# Patient Record
Sex: FEMALE | Race: WHITE | NOT HISPANIC OR LATINO | Employment: FULL TIME | URBAN - METROPOLITAN AREA
[De-identification: names, ages, dates, MRNs, and addresses within clinical notes are randomized per-mention and may not be internally consistent; named-entity substitution may affect disease eponyms.]

---

## 2023-12-05 ENCOUNTER — APPOINTMENT (OUTPATIENT)
Dept: PRIMARY CARE | Facility: CLINIC | Age: 48
End: 2023-12-05
Payer: COMMERCIAL

## 2024-01-24 ENCOUNTER — TELEPHONE (OUTPATIENT)
Dept: PRIMARY CARE | Facility: CLINIC | Age: 49
End: 2024-01-24
Payer: COMMERCIAL

## 2024-01-24 DIAGNOSIS — F41.1 ANXIETY, GENERALIZED: Primary | ICD-10-CM

## 2024-01-24 RX ORDER — BUSPIRONE HYDROCHLORIDE 7.5 MG/1
7.5 TABLET ORAL DAILY PRN
COMMUNITY
Start: 2023-05-24 | End: 2024-01-24 | Stop reason: SDUPTHER

## 2024-01-24 RX ORDER — BUSPIRONE HYDROCHLORIDE 7.5 MG/1
7.5 TABLET ORAL DAILY PRN
Qty: 30 TABLET | Refills: 0 | Status: SHIPPED | OUTPATIENT
Start: 2024-01-24 | End: 2024-05-08 | Stop reason: SDUPTHER

## 2024-01-24 NOTE — TELEPHONE ENCOUNTER
Refills for Buspirone and also she is interested  in a migraine medication.    Pharm- [facility] on file

## 2024-01-25 ENCOUNTER — APPOINTMENT (OUTPATIENT)
Dept: PRIMARY CARE | Facility: CLINIC | Age: 49
End: 2024-01-25
Payer: COMMERCIAL

## 2024-02-07 ENCOUNTER — OFFICE VISIT (OUTPATIENT)
Dept: PRIMARY CARE | Facility: CLINIC | Age: 49
End: 2024-02-07
Payer: COMMERCIAL

## 2024-02-07 ENCOUNTER — LAB (OUTPATIENT)
Dept: LAB | Facility: LAB | Age: 49
End: 2024-02-07
Payer: COMMERCIAL

## 2024-02-07 VITALS
HEART RATE: 72 BPM | TEMPERATURE: 97.6 F | WEIGHT: 222.8 LBS | OXYGEN SATURATION: 98 % | DIASTOLIC BLOOD PRESSURE: 82 MMHG | SYSTOLIC BLOOD PRESSURE: 121 MMHG | HEIGHT: 66 IN | BODY MASS INDEX: 35.81 KG/M2

## 2024-02-07 DIAGNOSIS — Z12.12 ENCOUNTER FOR COLORECTAL CANCER SCREENING: ICD-10-CM

## 2024-02-07 DIAGNOSIS — Z00.00 ANNUAL PHYSICAL EXAM: Primary | ICD-10-CM

## 2024-02-07 DIAGNOSIS — Z12.31 ENCOUNTER FOR SCREENING MAMMOGRAM FOR BREAST CANCER: ICD-10-CM

## 2024-02-07 DIAGNOSIS — E55.9 VITAMIN D DEFICIENCY: Primary | ICD-10-CM

## 2024-02-07 DIAGNOSIS — Z12.11 ENCOUNTER FOR COLORECTAL CANCER SCREENING: ICD-10-CM

## 2024-02-07 DIAGNOSIS — G43.009 MIGRAINE WITHOUT AURA AND WITHOUT STATUS MIGRAINOSUS, NOT INTRACTABLE: ICD-10-CM

## 2024-02-07 DIAGNOSIS — Z00.00 ANNUAL PHYSICAL EXAM: ICD-10-CM

## 2024-02-07 LAB
25(OH)D3 SERPL-MCNC: 19 NG/ML (ref 30–100)
ALBUMIN SERPL BCP-MCNC: 4.1 G/DL (ref 3.4–5)
ALP SERPL-CCNC: 41 U/L (ref 33–110)
ALT SERPL W P-5'-P-CCNC: 14 U/L (ref 7–45)
ANION GAP SERPL CALC-SCNC: 10 MMOL/L (ref 10–20)
AST SERPL W P-5'-P-CCNC: 22 U/L (ref 9–39)
BASOPHILS # BLD AUTO: 0.03 X10*3/UL (ref 0–0.1)
BASOPHILS NFR BLD AUTO: 0.3 %
BILIRUB SERPL-MCNC: 0.7 MG/DL (ref 0–1.2)
BUN SERPL-MCNC: 8 MG/DL (ref 6–23)
CALCIUM SERPL-MCNC: 9.6 MG/DL (ref 8.6–10.6)
CHLORIDE SERPL-SCNC: 103 MMOL/L (ref 98–107)
CHOLEST SERPL-MCNC: 170 MG/DL (ref 0–199)
CHOLESTEROL/HDL RATIO: 2.6
CO2 SERPL-SCNC: 31 MMOL/L (ref 21–32)
CREAT SERPL-MCNC: 0.8 MG/DL (ref 0.5–1.05)
EGFRCR SERPLBLD CKD-EPI 2021: 90 ML/MIN/1.73M*2
EOSINOPHIL # BLD AUTO: 0.04 X10*3/UL (ref 0–0.7)
EOSINOPHIL NFR BLD AUTO: 0.5 %
ERYTHROCYTE [DISTWIDTH] IN BLOOD BY AUTOMATED COUNT: 13.3 % (ref 11.5–14.5)
EST. AVERAGE GLUCOSE BLD GHB EST-MCNC: 108 MG/DL
GLUCOSE SERPL-MCNC: 90 MG/DL (ref 74–99)
HBA1C MFR BLD: 5.4 %
HCT VFR BLD AUTO: 40.5 % (ref 36–46)
HDLC SERPL-MCNC: 65.8 MG/DL
HGB BLD-MCNC: 13 G/DL (ref 12–16)
IMM GRANULOCYTES # BLD AUTO: 0.02 X10*3/UL (ref 0–0.7)
IMM GRANULOCYTES NFR BLD AUTO: 0.2 % (ref 0–0.9)
LDLC SERPL CALC-MCNC: 96 MG/DL
LYMPHOCYTES # BLD AUTO: 0.68 X10*3/UL (ref 1.2–4.8)
LYMPHOCYTES NFR BLD AUTO: 7.9 %
MCH RBC QN AUTO: 30 PG (ref 26–34)
MCHC RBC AUTO-ENTMCNC: 32.1 G/DL (ref 32–36)
MCV RBC AUTO: 93 FL (ref 80–100)
MONOCYTES # BLD AUTO: 0.73 X10*3/UL (ref 0.1–1)
MONOCYTES NFR BLD AUTO: 8.4 %
NEUTROPHILS # BLD AUTO: 7.16 X10*3/UL (ref 1.2–7.7)
NEUTROPHILS NFR BLD AUTO: 82.7 %
NON HDL CHOLESTEROL: 104 MG/DL (ref 0–149)
NRBC BLD-RTO: 0 /100 WBCS (ref 0–0)
PLATELET # BLD AUTO: 242 X10*3/UL (ref 150–450)
POTASSIUM SERPL-SCNC: 4.2 MMOL/L (ref 3.5–5.3)
PROT SERPL-MCNC: 7.1 G/DL (ref 6.4–8.2)
RBC # BLD AUTO: 4.34 X10*6/UL (ref 4–5.2)
SODIUM SERPL-SCNC: 140 MMOL/L (ref 136–145)
TRIGL SERPL-MCNC: 42 MG/DL (ref 0–149)
TSH SERPL-ACNC: 2.91 MIU/L (ref 0.44–3.98)
VIT B12 SERPL-MCNC: 1025 PG/ML (ref 211–911)
VLDL: 8 MG/DL (ref 0–40)
WBC # BLD AUTO: 8.7 X10*3/UL (ref 4.4–11.3)

## 2024-02-07 PROCEDURE — 82306 VITAMIN D 25 HYDROXY: CPT

## 2024-02-07 PROCEDURE — 84443 ASSAY THYROID STIM HORMONE: CPT

## 2024-02-07 PROCEDURE — 90471 IMMUNIZATION ADMIN: CPT | Performed by: INTERNAL MEDICINE

## 2024-02-07 PROCEDURE — 82607 VITAMIN B-12: CPT

## 2024-02-07 PROCEDURE — 80053 COMPREHEN METABOLIC PANEL: CPT

## 2024-02-07 PROCEDURE — 83036 HEMOGLOBIN GLYCOSYLATED A1C: CPT

## 2024-02-07 PROCEDURE — 1036F TOBACCO NON-USER: CPT | Performed by: INTERNAL MEDICINE

## 2024-02-07 PROCEDURE — 80061 LIPID PANEL: CPT

## 2024-02-07 PROCEDURE — 36415 COLL VENOUS BLD VENIPUNCTURE: CPT

## 2024-02-07 PROCEDURE — 99396 PREV VISIT EST AGE 40-64: CPT | Performed by: INTERNAL MEDICINE

## 2024-02-07 PROCEDURE — 90715 TDAP VACCINE 7 YRS/> IM: CPT | Performed by: INTERNAL MEDICINE

## 2024-02-07 PROCEDURE — 85025 COMPLETE CBC W/AUTO DIFF WBC: CPT

## 2024-02-07 RX ORDER — ELETRIPTAN HYDROBROMIDE 20 MG/1
20 TABLET, FILM COATED ORAL ONCE AS NEEDED
Qty: 18 TABLET | Refills: 3 | Status: SHIPPED | OUTPATIENT
Start: 2024-02-07 | End: 2024-02-09

## 2024-02-07 RX ORDER — ERGOCALCIFEROL 1.25 MG/1
50000 CAPSULE ORAL
Qty: 4 CAPSULE | Refills: 1 | Status: SHIPPED | OUTPATIENT
Start: 2024-02-07 | End: 2024-04-03

## 2024-02-07 ASSESSMENT — PATIENT HEALTH QUESTIONNAIRE - PHQ9
1. LITTLE INTEREST OR PLEASURE IN DOING THINGS: NOT AT ALL
2. FEELING DOWN, DEPRESSED OR HOPELESS: NOT AT ALL
SUM OF ALL RESPONSES TO PHQ9 QUESTIONS 1 AND 2: 0

## 2024-02-07 NOTE — RESULT ENCOUNTER NOTE
Reviewed your labs.  B12 is higher than normal.  Take supplements orally 3 times a week  Vitamin D is still low.  I called in high-dose prescription to be taken for 3 months  Rest of the labs including thyroid function and liver and kidney function and cholesterol normal  Blood sugar is also normal

## 2024-02-07 NOTE — PROGRESS NOTES
Subjective   Patient ID: Mary Brown is a 48 y.o. female who presents for Annual Exam.  HPI    Patient is here for annual exam  Has been getting headache on left side with nausea and photophobia.at least 4 times a month.some times back of head.no aura.not intractable.  Last exam was one  year ago ,sees ophthalmologist regularly.    Consumes healthy diet.    does regular exercise  pregnancy history g0  No bladder symptoms  Cervical cancer screen current .  Mammogram due   Colonoscopy not done.      Review of Systems  General: lost wt by changing diet.has fatigue, no fever, chills, or night sweats.  HEENT: No dizziness, syncope. No blurred vision, double vision. No hearing loss, or ringing. No nasal discharge, sinus problems. No loose teeth, sore throat, hoarseness or neck stiffness.   Breast: No pain or discharge. No mass felt.   Respiratory: No cough, mucous in throat, hemoptysis, wheezing, or shortness of breath. No snoring.  Cardiac: No chest pain, palpitations, orthopnea. No leg swelling or claudication pain.   Gastrointestinal: No indigestion, heartburn, nausea, vomiting, diarrhea, constipation, rectal bleeding or hemorrhoids.  Genitourinary: No UTI symptoms, stones, incontinence.  OBGYN: No abnormal bleeding, No pelvic pain or bloating.  Endocrine: No excess thirst or urination.  Hematopoietic: No anemia, easy bruising or bleeding. No history of blood transfusion.  Neuro: No localized weakness, numbness or tingling. No tremor, history of seizure. No history of memory problems.  Psychological: No anxiety, depression or sleep disturbance.    HISTORY  Social History     Socioeconomic History    Marital status: Single     Spouse name: Not on file    Number of children: Not on file    Years of education: Not on file    Highest education level: Not on file   Occupational History    Not on file   Tobacco Use    Smoking status: Never    Smokeless tobacco: Never   Substance and Sexual Activity    Alcohol use: Yes  "   Drug use: Not on file    Sexual activity: Not on file   Other Topics Concern    Not on file   Social History Narrative    Not on file     Social Determinants of Health     Financial Resource Strain: Not on file   Food Insecurity: Not on file   Transportation Needs: Not on file   Physical Activity: Not on file   Stress: Not on file   Social Connections: Not on file   Intimate Partner Violence: Not on file   Housing Stability: Not on file     Family History   Problem Relation Name Age of Onset    Coronary artery disease Father      Hypoparathyroidism Father      Diabetes type II Father       History reviewed. No pertinent past medical history.  History reviewed. No pertinent surgical history.    Objective   /82   Pulse 72   Temp 36.4 °C (97.6 °F)   Ht 1.676 m (5' 6\")   Wt 101 kg (222 lb 12.8 oz)   SpO2 98%   BMI 35.96 kg/m²      Lab Results   Component Value Date    WBC 5.4 02/17/2021    HGB 12.7 02/17/2021    HCT 40.5 02/17/2021    MCV 94 02/17/2021     02/17/2021      Physical Exam  In general, well-appearing, not in acute distress, alert and oriented.  HEENT: Pupils PERRLA.  No pallor or icterus  Neck: No lymphadenopathy, no stiffness.  Supple.  No enlargement of thyroid.No JVD  Chest: Clear to auscultation, good air entry.  CVS: S1 and S2 regular.  No murmur, no gallop, S3 or S4.  No peripheral edema.  No carotid bruit.  Abdomen: Soft, no tenderness, no hepatosplenomegaly.  Extremities: No calf tenderness.  No peripheral edema.  No knee or ankle effusion.  No finger synovitis.  No clubbing or cyanosis.  Neuro: No focal deficits.  Psych: Mood and affect normal.  Good judgment and insight  Skin:No rash    Assessment/Plan   Problem List Items Addressed This Visit       Annual physical exam - Primary    Relevant Orders    CBC and Auto Differential    Comprehensive Metabolic Panel    Hemoglobin A1C    Lipid Panel    TSH with reflex to Free T4 if abnormal    Vitamin B12    Vitamin D " 25-Hydroxy,Total (for eval of Vitamin D levels)     Other Visit Diagnoses       Encounter for colorectal cancer screening        Relevant Orders    Cologuard® colon cancer screening    Migraine without aura and without status migrainosus, not intractable        Relevant Medications    eletriptan (Relpax) 20 mg tablet    Encounter for screening mammogram for breast cancer        Relevant Orders    BI mammo bilateral screening tomosynthesis          Preventive exam and counseling completed  Routine labs ordered.  Due to migraine additional labs  Try Relpax  Do stretches and trigger point release  Continue massage therapy for neck pain  Cologuard for screening.  Declines colonoscopy  Up-to-date with GYN care  Mammogram ordered.  TLC to be continued for weight loss

## 2024-02-08 ENCOUNTER — TELEPHONE (OUTPATIENT)
Dept: PRIMARY CARE | Facility: CLINIC | Age: 49
End: 2024-02-08

## 2024-02-08 NOTE — TELEPHONE ENCOUNTER
Patient states that the Eletriptan is too expensive and would like to know if you can call in a new med ?

## 2024-02-09 DIAGNOSIS — G43.009 MIGRAINE WITHOUT AURA AND WITHOUT STATUS MIGRAINOSUS, NOT INTRACTABLE: Primary | ICD-10-CM

## 2024-02-09 RX ORDER — RIZATRIPTAN BENZOATE 10 MG/1
10 TABLET, ORALLY DISINTEGRATING ORAL ONCE AS NEEDED
Qty: 9 TABLET | Refills: 0 | Status: SHIPPED | OUTPATIENT
Start: 2024-02-09 | End: 2024-03-20

## 2024-02-29 ENCOUNTER — APPOINTMENT (OUTPATIENT)
Dept: PRIMARY CARE | Facility: CLINIC | Age: 49
End: 2024-02-29
Payer: COMMERCIAL

## 2024-03-20 ENCOUNTER — TELEPHONE (OUTPATIENT)
Dept: PRIMARY CARE | Facility: CLINIC | Age: 49
End: 2024-03-20
Payer: COMMERCIAL

## 2024-03-20 DIAGNOSIS — G43.009 MIGRAINE WITHOUT AURA AND WITHOUT STATUS MIGRAINOSUS, NOT INTRACTABLE: ICD-10-CM

## 2024-03-20 RX ORDER — SUMATRIPTAN 50 MG/1
50 TABLET, FILM COATED ORAL ONCE AS NEEDED
Qty: 30 TABLET | Refills: 3 | Status: SHIPPED | OUTPATIENT
Start: 2024-03-20 | End: 2025-03-20

## 2024-03-20 NOTE — TELEPHONE ENCOUNTER
----- Message from Amira Miller MD sent at 3/19/2024  4:58 PM EDT -----  Please call patient and give her the test results and  ask her to review test results in chart.

## 2024-03-20 NOTE — TELEPHONE ENCOUNTER
Spoke with pt she is aware of results.    Discussed with patient.  Advised to cut down on B12 to 3 times a week.  She states that she is only taking it once a week and will continue that  After 2 multivitamin daily can take over-the-counter 2000 units daily

## 2024-05-08 ENCOUNTER — LAB (OUTPATIENT)
Dept: LAB | Facility: LAB | Age: 49
End: 2024-05-08
Payer: COMMERCIAL

## 2024-05-08 ENCOUNTER — OFFICE VISIT (OUTPATIENT)
Dept: PRIMARY CARE | Facility: CLINIC | Age: 49
End: 2024-05-08
Payer: COMMERCIAL

## 2024-05-08 VITALS
DIASTOLIC BLOOD PRESSURE: 93 MMHG | SYSTOLIC BLOOD PRESSURE: 137 MMHG | HEIGHT: 66 IN | BODY MASS INDEX: 35.96 KG/M2 | OXYGEN SATURATION: 97 % | HEART RATE: 59 BPM

## 2024-05-08 DIAGNOSIS — L30.9 DERMATITIS: ICD-10-CM

## 2024-05-08 DIAGNOSIS — F41.1 ANXIETY, GENERALIZED: Primary | ICD-10-CM

## 2024-05-08 DIAGNOSIS — N95.1 MENOPAUSAL SYMPTOM: ICD-10-CM

## 2024-05-08 DIAGNOSIS — G43.009 MIGRAINE WITHOUT AURA AND WITHOUT STATUS MIGRAINOSUS, NOT INTRACTABLE: ICD-10-CM

## 2024-05-08 LAB
ESTRADIOL SERPL-MCNC: 41 PG/ML
FSH SERPL-ACNC: 74.7 IU/L
LH SERPL-ACNC: 48.3 IU/L
PROGEST SERPL-MCNC: <0.3 NG/ML
PROLACTIN SERPL-MCNC: 18 UG/L (ref 3–20)

## 2024-05-08 PROCEDURE — 82677 ASSAY OF ESTRIOL: CPT

## 2024-05-08 PROCEDURE — 83001 ASSAY OF GONADOTROPIN (FSH): CPT

## 2024-05-08 PROCEDURE — 84146 ASSAY OF PROLACTIN: CPT

## 2024-05-08 PROCEDURE — 84144 ASSAY OF PROGESTERONE: CPT

## 2024-05-08 PROCEDURE — 82670 ASSAY OF TOTAL ESTRADIOL: CPT

## 2024-05-08 PROCEDURE — 36415 COLL VENOUS BLD VENIPUNCTURE: CPT

## 2024-05-08 PROCEDURE — 1036F TOBACCO NON-USER: CPT | Performed by: INTERNAL MEDICINE

## 2024-05-08 PROCEDURE — 99214 OFFICE O/P EST MOD 30 MIN: CPT | Performed by: INTERNAL MEDICINE

## 2024-05-08 PROCEDURE — 82672 ASSAY OF ESTROGEN: CPT

## 2024-05-08 PROCEDURE — 83002 ASSAY OF GONADOTROPIN (LH): CPT

## 2024-05-08 RX ORDER — UBROGEPANT 50 MG/1
1 TABLET ORAL DAILY PRN
Qty: 10 TABLET | Refills: 0 | Status: SHIPPED | OUTPATIENT
Start: 2024-05-08

## 2024-05-08 RX ORDER — CLOTRIMAZOLE AND BETAMETHASONE DIPROPIONATE 10; .64 MG/G; MG/G
1 CREAM TOPICAL 2 TIMES DAILY
Qty: 30 G | Refills: 0 | Status: SHIPPED | OUTPATIENT
Start: 2024-05-08 | End: 2024-06-05

## 2024-05-08 RX ORDER — METHYLPREDNISOLONE 4 MG/1
TABLET ORAL
Qty: 21 TABLET | Refills: 0 | Status: SHIPPED | OUTPATIENT
Start: 2024-05-08 | End: 2024-05-15

## 2024-05-08 RX ORDER — BUSPIRONE HYDROCHLORIDE 7.5 MG/1
15 TABLET ORAL DAILY
Qty: 30 TABLET | Refills: 1 | Status: SHIPPED | OUTPATIENT
Start: 2024-05-08 | End: 2024-06-07

## 2024-05-08 ASSESSMENT — PATIENT HEALTH QUESTIONNAIRE - PHQ9
SUM OF ALL RESPONSES TO PHQ9 QUESTIONS 1 AND 2: 0
1. LITTLE INTEREST OR PLEASURE IN DOING THINGS: NOT AT ALL
2. FEELING DOWN, DEPRESSED OR HOPELESS: NOT AT ALL

## 2024-05-08 NOTE — PROGRESS NOTES
"Subjective   Patient ID: Mary Brown is a 48 y.o. female who presents for Follow-up (Pt has severe headache 2 days now. ).    HPI   Has right sided headache since yesterday  Right side of face feels numb  Pain is 7/10.migraine is more frequent.getting it 4 times a month.  This is not the worst headache of her life  No nausea or vomiting  Slight dizziness  Imitrex did not work for migraine  Had a party and had more sugar Monday  Thinks it triggered migraine.no aura.lasts more than 3 ays  sometimes  Was drinking energy drinks  No family history of aneurysm  Continues to have brain fog  Has endometriosis  Lmp November  No hot flashes  Has mood issues,gets irritable.on buspar as needed  Not depressed  Has work issues      Review of Systems  Constitutional: No recent weight loss, no fever or chills or night sweats. tired  HEENT: No blurred vision or double vision.  No hearing loss.  no sinus drainage or nosebleeds  CVS: No exertional chest pain or shortness of breath.  No palpitation or edema  Respiratory: No chronic cough or shortness of breath or wheezing  GI: No nausea vomiting or heartburn diarrhea or constipation.  No rectal bleed or bowel changes  Genitourinary: No urinary frequency or hesitancy.  No nocturia or blood in urine  Neuro: No weakness  Psych:  Objective   BP (!) 137/93   Pulse 59   Ht 1.676 m (5' 6\")   SpO2 97%   BMI 35.96 kg/m²     Physical Exam  In general, well-appearing, not in acute distress, alert and oriented.  HEENT: Pupils PERRLA.  No pallor or icterus  Neck: No lymphadenopathy, no stiffness.  Supple.  No enlargement of thyroid.No JVD  Chest: Clear to auscultation, good air entry.  CVS: S1 and S2 regular.  No murmur, no gallop, S3 or S4.  No peripheral edema.  No carotid bruit.  Abdomen: Soft, no tenderness, no hepatosplenomegaly.  Extremities: No calf tenderness.  No peripheral edema.  No knee or ankle effusion.  No finger synovitis.  No clubbing or cyanosis.  Neuro: No focal " deficits.  Cranial nerves intact.  Strength normal.   Psych: Mood and affect normal.    Dermatitis right posterior arm  Assessment/Plan   Problem List Items Addressed This Visit             ICD-10-CM    Anxiety, generalized - Primary F41.1    Relevant Medications    busPIRone (Buspar) 7.5 mg tablet    Migraine without aura and without status migrainosus, not intractable G43.009    Relevant Medications    ubrogepant (Ubrelvy) 50 mg tablet    methylPREDNISolone (Medrol Dospak) 4 mg tablets    Other Relevant Orders    MR brain w and wo IV contrast    Progesterone    Estradiol    Estriol    Estrogens, Total    FSH    Luteinizing hormone    Prolactin level    Menopausal symptom N95.1    Relevant Orders    Progesterone    Estradiol    Estriol    Estrogens, Total    FSH    Luteinizing hormone    Prolactin level    Dermatitis L30.9    Relevant Medications    clotrimazole-betamethasone (Lotrisone) cream   Has severe migraine today with some numbness of right side of face.  Sumatriptan did not help.  She also has strong family history of heart disease  Never had MRI  Migraine is getting more frequent  Will order MRI  She also has irritability brain fog and mood issues  Anxiety is not well-controlled either  Will check hormone levels  Will try Ubrelvy for migraine  Medrol pack treatment for now  Increase BuSpar to 15 mg and take it daily  Clotrimazole betamethasone cream for dermatitis  Will reassess in 3 to 4 weeks and decide on further treatment plan.  Increase fluid intake.  Avoid migraine triggers  Make sure to take vitamin D3 supplements daily  Go to ER for any worsening of headache.Follow up in 3 weeks.

## 2024-05-09 ENCOUNTER — TELEPHONE (OUTPATIENT)
Dept: PRIMARY CARE | Facility: CLINIC | Age: 49
End: 2024-05-09
Payer: COMMERCIAL

## 2024-05-09 NOTE — TELEPHONE ENCOUNTER
Pharmacy called regarding Ubrogepant 50 mg, she would like to know if the patient can take more than one tablet at a time for margines ? They are running into an issue with insurance.

## 2024-05-13 LAB
ESTRIOL SERPL-MCNC: <0.1 NG/ML
ESTROGEN SERPL-MCNC: 128 PG/ML

## 2024-05-13 NOTE — RESULT ENCOUNTER NOTE
Reviewed your labs.  Some are still pending.  FSH and LH  came back high.  This is in the menopausal range.  Will start hormone replacement once I get all the results.  Hopefully your headache is better

## 2024-05-14 NOTE — RESULT ENCOUNTER NOTE
Reviewed all your labs.  Would like to discuss hormone replacement therapy for your symptoms.  Please call me to discuss

## 2024-05-21 ENCOUNTER — APPOINTMENT (OUTPATIENT)
Dept: RADIOLOGY | Facility: CLINIC | Age: 49
End: 2024-05-21
Payer: COMMERCIAL

## 2024-06-03 ENCOUNTER — APPOINTMENT (OUTPATIENT)
Dept: PRIMARY CARE | Facility: CLINIC | Age: 49
End: 2024-06-03
Payer: COMMERCIAL

## 2024-06-04 ENCOUNTER — HOSPITAL ENCOUNTER (OUTPATIENT)
Dept: RADIOLOGY | Facility: CLINIC | Age: 49
Discharge: HOME | End: 2024-06-04
Payer: COMMERCIAL

## 2024-06-04 VITALS — BODY MASS INDEX: 35.94 KG/M2 | WEIGHT: 222.66 LBS

## 2024-06-04 DIAGNOSIS — G43.009 MIGRAINE WITHOUT AURA AND WITHOUT STATUS MIGRAINOSUS, NOT INTRACTABLE: ICD-10-CM

## 2024-06-04 PROCEDURE — A9575 INJ GADOTERATE MEGLUMI 0.1ML: HCPCS | Performed by: INTERNAL MEDICINE

## 2024-06-04 PROCEDURE — 70553 MRI BRAIN STEM W/O & W/DYE: CPT

## 2024-06-04 PROCEDURE — 70553 MRI BRAIN STEM W/O & W/DYE: CPT | Performed by: RADIOLOGY

## 2024-06-04 PROCEDURE — 2550000001 HC RX 255 CONTRASTS: Performed by: INTERNAL MEDICINE

## 2024-06-04 RX ORDER — GADOTERATE MEGLUMINE 376.9 MG/ML
20 INJECTION INTRAVENOUS
Status: COMPLETED | OUTPATIENT
Start: 2024-06-04 | End: 2024-06-04

## 2024-06-04 RX ADMIN — GADOTERATE MEGLUMINE 20 ML: 376.9 INJECTION INTRAVENOUS at 10:15

## 2024-06-05 ENCOUNTER — APPOINTMENT (OUTPATIENT)
Dept: PRIMARY CARE | Facility: CLINIC | Age: 49
End: 2024-06-05
Payer: COMMERCIAL

## 2024-06-05 ENCOUNTER — TELEPHONE (OUTPATIENT)
Dept: PRIMARY CARE | Facility: CLINIC | Age: 49
End: 2024-06-05

## 2024-06-05 DIAGNOSIS — R90.89 ABNORMAL BRAIN MRI: Primary | ICD-10-CM

## 2024-06-05 NOTE — TELEPHONE ENCOUNTER
Result Communication  discussed with patient.  MRI reported white matter changes which could be from migraine and other conditions as well.  It is also involving the gertrudis.  Will consult neurology for an opinion    Resulted Orders   MR brain w and wo IV contrast    Narrative    Interpreted By:  Erma Johnson and Kamau Nyokabi   STUDY:  MR BRAIN W AND WO IV CONTRAST;  6/4/2024 10:34 am      INDICATION:  Signs/Symptoms:headache ,facial numbness.      COMPARISON:  None.      ACCESSION NUMBER(S):  [id number]      ORDERING CLINICIAN:  JEVON PENALOZA      TECHNIQUE:  Axial T2, FLAIR, DWI, gradient echo T2 and T1 weighted images of  brain were acquired. Post contrast T1 weighted images were acquired  after administration of 20 mL gadolinium based intravenous contrast.      FINDINGS:  CSF Spaces: The ventricles, sulci and basal cisterns are within  normal limits. No extra-axial fluid collections.      Parenchyma: There is no diffusion restriction abnormality to suggest  acute infarct.  There are a few scattered very small hyperintensities  on FLAIR and T2 imaging in the periventricular and subcortical white  matter. There may also be involvement of the gertrudis. Prominent  perivascular spaces and/or small, remote lacunar infarcts are  demonstrated in the basal ganglia. There is no mass effect or midline  shift. No abnormal parenchymal enhancement on postcontrast imaging.      Paranasal Sinuses and Mastoids: Visualized paranasal sinuses and  mastoid air cells are unremarkable.        Impression    Minimal white-matter changes are nonspecific and may be seen with  migraine headaches, demyelinating processes, chronic hypertension,  among others. There is also involvement of the gertrudis. No evidence of  acute ischemic injury or enhancing mass lesion.      I personally reviewed the images/study and I agree with Dr. Sterling Helm findings as stated. This study was interpreted at [facility],  [place], [place]      MACRO:  None      Signed by: Erma Johnson 6/4/2024 12:54 PM  Dictation workstation:   [id number]       5:32 PM      Results were successfully communicated with the patient and they acknowledged their understanding.

## 2024-06-14 ENCOUNTER — OFFICE VISIT (OUTPATIENT)
Dept: NEUROLOGY | Facility: CLINIC | Age: 49
End: 2024-06-14
Payer: COMMERCIAL

## 2024-06-14 ENCOUNTER — APPOINTMENT (OUTPATIENT)
Dept: NEUROLOGY | Facility: CLINIC | Age: 49
End: 2024-06-14
Payer: COMMERCIAL

## 2024-06-14 VITALS
HEIGHT: 66 IN | SYSTOLIC BLOOD PRESSURE: 112 MMHG | RESPIRATION RATE: 16 BRPM | DIASTOLIC BLOOD PRESSURE: 81 MMHG | HEART RATE: 75 BPM | BODY MASS INDEX: 35.94 KG/M2

## 2024-06-14 DIAGNOSIS — R90.89 ABNORMAL BRAIN MRI: ICD-10-CM

## 2024-06-14 DIAGNOSIS — G43.009 MIGRAINE WITHOUT AURA, NOT INTRACTABLE, WITHOUT STATUS MIGRAINOSUS: Primary | ICD-10-CM

## 2024-06-14 PROCEDURE — 1036F TOBACCO NON-USER: CPT | Performed by: STUDENT IN AN ORGANIZED HEALTH CARE EDUCATION/TRAINING PROGRAM

## 2024-06-14 PROCEDURE — 99204 OFFICE O/P NEW MOD 45 MIN: CPT | Performed by: STUDENT IN AN ORGANIZED HEALTH CARE EDUCATION/TRAINING PROGRAM

## 2024-06-14 ASSESSMENT — PATIENT HEALTH QUESTIONNAIRE - PHQ9
1. LITTLE INTEREST OR PLEASURE IN DOING THINGS: NOT AT ALL
SUM OF ALL RESPONSES TO PHQ9 QUESTIONS 1 AND 2: 0
2. FEELING DOWN, DEPRESSED OR HOPELESS: NOT AT ALL

## 2024-06-14 ASSESSMENT — ENCOUNTER SYMPTOMS
LOSS OF SENSATION IN FEET: 0
OCCASIONAL FEELINGS OF UNSTEADINESS: 0
DEPRESSION: 0

## 2024-06-14 NOTE — PROGRESS NOTES
"Subjective     Chief Complaint: Headache    Mary Brown is a 48 y.o. year old female who presents with chief complaint of headaches.    Mary started getting headaches at age 46. No headaches prior to this, mainly with starting new job. Currently having 1/30 HA days per month. The headaches are usually throbbing and are located in sinuses and occipital, generally unilateral. The patient rates her most severe headaches a 8 in intensity. Generally, headaches last about 8 hours in duration. Associated nausea, photophobia, and phonophobia. Headaches are worsened with exertion. Triggers include stress.    No clear aura.    Had MRI brain where she was told concern for MS.     Currently menopausal.     Current Acute Headache Treatment Ubrelvy (Ubrogepant)  (effective)   Current Preventative Headache Treatment None   Previous Acute Headache Treatment  None   Previous Preventative Headache Treatment None       ROS: As per HPI, otherwise all other systems have been reviewed are negative for complaint.     No past medical history on file.  No past surgical history on file.  Family History   Problem Relation Name Age of Onset    Coronary artery disease Father      Hypoparathyroidism Father      Diabetes type II Father      Migraines Neg Hx       Social History     Tobacco Use    Smoking status: Never    Smokeless tobacco: Never   Substance Use Topics    Alcohol use: Yes     Comment: socially        Objective   /81   Pulse 75   Resp 16   Ht 1.676 m (5' 6\")   BMI 35.94 kg/m²     Neuro Exam:  Cardiac Exam: No apparent edema of b/l lower extremities  Neurological Exam:  MENTAL STATUS:   General Appearance: No distress, alert, interactive, and cooperative. Orientation was normal to time, place and person. Recent and remote memory was intact.     OPHTHALMOSCOPIC:   The ophthalmoscopic exam was normal. The fundi were well visualized with normal disc margins, clear vessels and vascular pulsations. No disc edema. The " cup/disk ratio was not enlarged. No hemorrhages or exudates were present in the posterior segments that were visualized.     CRANIAL NERVES:   CN 2         Visual fields full to confrontation.   CN 3, 4, 6   Pupils round, 4 mm in diameter, equally reactive to light. Lids symmetric; no ptosis. EOMs normal alignment, full range with normal saccades, pursuit and convergence.   No nystagmus.   CN 5   Facial sensation intact bilaterally.   CN 7   Normal and symmetric facial strength. Nasolabial folds symmetric.   CN 8   Hearing intact to conversation and finger rub.  CN 9, 10   Palate elevates symmetrically.  CN 11   Normal strength of shoulder shrug and neck turning.   CN 12   Tongue midline, with normal bulk and strength; no fasciculations.     MOTOR:   Muscle bulk and tone were normal in both upper and lower extremities.   No pronator drift bilaterally.  No fasciculations, tremor or other abnormal movements evident with the patient examined clothed.    STRENGTH:  R  L  Deltoid            5          5  Biceps  5 5  Triceps  5 5    Hip flexion 5 5  Knee Flex 5 5  Knee Ex 5 5    REFLEXES: R L  Biceps  2 2                     Triceps  2 2  Patellar  2 2     SENSORY:   In both upper and lower extremities, sensation was intact to light touch.    COORDINATION:   In both upper extremities, finger-nose-finger was intact without dysmetria or overshoot.     GAIT:   Station was stable with a normal base. Gait was stable with a normal arm swing and speed. No ataxia, shuffling, steppage or waddling was present. No circumduction was present. No Romberg sign was present.    Assessment/Plan   Given the description and frequency of headaches, patient likely has episodic migraine without aura. Headaches are well controlled with Ubrelvy. MRI brain wwo personally reviewed and only shows nonspecific white matter disease, likely age and migraine related. Normal neurologic exam. Low concern for MS.     - continue Ubrelvy PRN  - follow up  PRN

## 2024-06-16 ENCOUNTER — APPOINTMENT (OUTPATIENT)
Dept: RADIOLOGY | Facility: HOSPITAL | Age: 49
End: 2024-06-16
Payer: COMMERCIAL

## 2024-06-16 ENCOUNTER — HOSPITAL ENCOUNTER (EMERGENCY)
Facility: HOSPITAL | Age: 49
Discharge: HOME | End: 2024-06-16
Attending: EMERGENCY MEDICINE
Payer: COMMERCIAL

## 2024-06-16 VITALS
DIASTOLIC BLOOD PRESSURE: 73 MMHG | SYSTOLIC BLOOD PRESSURE: 123 MMHG | BODY MASS INDEX: 35.83 KG/M2 | OXYGEN SATURATION: 97 % | RESPIRATION RATE: 18 BRPM | WEIGHT: 222 LBS | HEART RATE: 91 BPM | TEMPERATURE: 98.8 F

## 2024-06-16 DIAGNOSIS — N39.0 URINARY TRACT INFECTION WITHOUT HEMATURIA, SITE UNSPECIFIED: Primary | ICD-10-CM

## 2024-06-16 LAB
ALBUMIN SERPL BCP-MCNC: 3.8 G/DL (ref 3.4–5)
ALP SERPL-CCNC: 64 U/L (ref 33–110)
ALT SERPL W P-5'-P-CCNC: 20 U/L (ref 7–45)
ANION GAP SERPL CALC-SCNC: 11 MMOL/L (ref 10–20)
APPEARANCE UR: CLEAR
AST SERPL W P-5'-P-CCNC: 21 U/L (ref 9–39)
BACTERIA #/AREA URNS AUTO: ABNORMAL /HPF
BASOPHILS # BLD AUTO: 0.02 X10*3/UL (ref 0–0.1)
BASOPHILS NFR BLD AUTO: 0.2 %
BILIRUB SERPL-MCNC: 0.6 MG/DL (ref 0–1.2)
BILIRUB UR STRIP.AUTO-MCNC: NEGATIVE MG/DL
BUN SERPL-MCNC: 12 MG/DL (ref 6–23)
CALCIUM SERPL-MCNC: 8.9 MG/DL (ref 8.6–10.3)
CHLORIDE SERPL-SCNC: 104 MMOL/L (ref 98–107)
CO2 SERPL-SCNC: 24 MMOL/L (ref 21–32)
COLOR UR: YELLOW
CREAT SERPL-MCNC: 0.95 MG/DL (ref 0.5–1.05)
EGFRCR SERPLBLD CKD-EPI 2021: 74 ML/MIN/1.73M*2
EOSINOPHIL # BLD AUTO: 0.01 X10*3/UL (ref 0–0.7)
EOSINOPHIL NFR BLD AUTO: 0.1 %
ERYTHROCYTE [DISTWIDTH] IN BLOOD BY AUTOMATED COUNT: 13.7 % (ref 11.5–14.5)
FLUAV RNA RESP QL NAA+PROBE: NOT DETECTED
FLUBV RNA RESP QL NAA+PROBE: NOT DETECTED
GLUCOSE SERPL-MCNC: 122 MG/DL (ref 74–99)
GLUCOSE UR STRIP.AUTO-MCNC: NORMAL MG/DL
HCG UR QL IA.RAPID: NEGATIVE
HCT VFR BLD AUTO: 39.3 % (ref 36–46)
HGB BLD-MCNC: 12.9 G/DL (ref 12–16)
HOLD SPECIMEN: NORMAL
IMM GRANULOCYTES # BLD AUTO: 0.04 X10*3/UL (ref 0–0.7)
IMM GRANULOCYTES NFR BLD AUTO: 0.4 % (ref 0–0.9)
KETONES UR STRIP.AUTO-MCNC: NEGATIVE MG/DL
LACTATE SERPL-SCNC: 1 MMOL/L (ref 0.4–2)
LEUKOCYTE ESTERASE UR QL STRIP.AUTO: ABNORMAL
LYMPHOCYTES # BLD AUTO: 0.35 X10*3/UL (ref 1.2–4.8)
LYMPHOCYTES NFR BLD AUTO: 3.3 %
MCH RBC QN AUTO: 30.4 PG (ref 26–34)
MCHC RBC AUTO-ENTMCNC: 32.8 G/DL (ref 32–36)
MCV RBC AUTO: 93 FL (ref 80–100)
MONOCYTES # BLD AUTO: 0.8 X10*3/UL (ref 0.1–1)
MONOCYTES NFR BLD AUTO: 7.5 %
NEUTROPHILS # BLD AUTO: 9.41 X10*3/UL (ref 1.2–7.7)
NEUTROPHILS NFR BLD AUTO: 88.5 %
NITRITE UR QL STRIP.AUTO: NEGATIVE
NRBC BLD-RTO: 0 /100 WBCS (ref 0–0)
PH UR STRIP.AUTO: 7.5 [PH]
PLATELET # BLD AUTO: 184 X10*3/UL (ref 150–450)
POTASSIUM SERPL-SCNC: 4.2 MMOL/L (ref 3.5–5.3)
PROT SERPL-MCNC: 7.2 G/DL (ref 6.4–8.2)
PROT UR STRIP.AUTO-MCNC: ABNORMAL MG/DL
RBC # BLD AUTO: 4.24 X10*6/UL (ref 4–5.2)
RBC # UR STRIP.AUTO: ABNORMAL /UL
RBC #/AREA URNS AUTO: ABNORMAL /HPF
SARS-COV-2 RNA RESP QL NAA+PROBE: NOT DETECTED
SODIUM SERPL-SCNC: 135 MMOL/L (ref 136–145)
SP GR UR STRIP.AUTO: 1.01
SQUAMOUS #/AREA URNS AUTO: ABNORMAL /HPF
UROBILINOGEN UR STRIP.AUTO-MCNC: NORMAL MG/DL
WBC # BLD AUTO: 10.6 X10*3/UL (ref 4.4–11.3)
WBC #/AREA URNS AUTO: >50 /HPF
WBC CLUMPS #/AREA URNS AUTO: ABNORMAL /HPF

## 2024-06-16 PROCEDURE — 96375 TX/PRO/DX INJ NEW DRUG ADDON: CPT

## 2024-06-16 PROCEDURE — 81025 URINE PREGNANCY TEST: CPT | Performed by: EMERGENCY MEDICINE

## 2024-06-16 PROCEDURE — 81001 URINALYSIS AUTO W/SCOPE: CPT | Performed by: EMERGENCY MEDICINE

## 2024-06-16 PROCEDURE — 99284 EMERGENCY DEPT VISIT MOD MDM: CPT

## 2024-06-16 PROCEDURE — 83605 ASSAY OF LACTIC ACID: CPT | Performed by: EMERGENCY MEDICINE

## 2024-06-16 PROCEDURE — 70486 CT MAXILLOFACIAL W/O DYE: CPT

## 2024-06-16 PROCEDURE — 70486 CT MAXILLOFACIAL W/O DYE: CPT | Performed by: RADIOLOGY

## 2024-06-16 PROCEDURE — 71045 X-RAY EXAM CHEST 1 VIEW: CPT | Performed by: RADIOLOGY

## 2024-06-16 PROCEDURE — 2500000004 HC RX 250 GENERAL PHARMACY W/ HCPCS (ALT 636 FOR OP/ED): Performed by: EMERGENCY MEDICINE

## 2024-06-16 PROCEDURE — 87086 URINE CULTURE/COLONY COUNT: CPT | Mod: PARLAB | Performed by: EMERGENCY MEDICINE

## 2024-06-16 PROCEDURE — 96361 HYDRATE IV INFUSION ADD-ON: CPT

## 2024-06-16 PROCEDURE — 36415 COLL VENOUS BLD VENIPUNCTURE: CPT | Performed by: EMERGENCY MEDICINE

## 2024-06-16 PROCEDURE — 96365 THER/PROPH/DIAG IV INF INIT: CPT

## 2024-06-16 PROCEDURE — 71045 X-RAY EXAM CHEST 1 VIEW: CPT

## 2024-06-16 PROCEDURE — 85025 COMPLETE CBC W/AUTO DIFF WBC: CPT | Performed by: EMERGENCY MEDICINE

## 2024-06-16 PROCEDURE — 87636 SARSCOV2 & INF A&B AMP PRB: CPT | Performed by: EMERGENCY MEDICINE

## 2024-06-16 PROCEDURE — 80053 COMPREHEN METABOLIC PANEL: CPT | Performed by: EMERGENCY MEDICINE

## 2024-06-16 RX ORDER — CEFTRIAXONE 1 G/50ML
1 INJECTION, SOLUTION INTRAVENOUS ONCE
Status: COMPLETED | OUTPATIENT
Start: 2024-06-16 | End: 2024-06-16

## 2024-06-16 RX ORDER — CEPHALEXIN 500 MG/1
500 CAPSULE ORAL 4 TIMES DAILY
Qty: 40 CAPSULE | Refills: 0 | Status: SHIPPED | OUTPATIENT
Start: 2024-06-16 | End: 2024-06-26

## 2024-06-16 RX ORDER — KETOROLAC TROMETHAMINE 30 MG/ML
30 INJECTION, SOLUTION INTRAMUSCULAR; INTRAVENOUS ONCE
Status: COMPLETED | OUTPATIENT
Start: 2024-06-16 | End: 2024-06-16

## 2024-06-16 ASSESSMENT — COLUMBIA-SUICIDE SEVERITY RATING SCALE - C-SSRS
1. IN THE PAST MONTH, HAVE YOU WISHED YOU WERE DEAD OR WISHED YOU COULD GO TO SLEEP AND NOT WAKE UP?: NO
6. HAVE YOU EVER DONE ANYTHING, STARTED TO DO ANYTHING, OR PREPARED TO DO ANYTHING TO END YOUR LIFE?: NO
2. HAVE YOU ACTUALLY HAD ANY THOUGHTS OF KILLING YOURSELF?: NO

## 2024-06-16 ASSESSMENT — LIFESTYLE VARIABLES
TOTAL SCORE: 0
EVER HAD A DRINK FIRST THING IN THE MORNING TO STEADY YOUR NERVES TO GET RID OF A HANGOVER: NO
HAVE YOU EVER FELT YOU SHOULD CUT DOWN ON YOUR DRINKING: NO
HAVE PEOPLE ANNOYED YOU BY CRITICIZING YOUR DRINKING: NO
EVER FELT BAD OR GUILTY ABOUT YOUR DRINKING: NO

## 2024-06-16 ASSESSMENT — PAIN - FUNCTIONAL ASSESSMENT
PAIN_FUNCTIONAL_ASSESSMENT: 0-10
PAIN_FUNCTIONAL_ASSESSMENT: 0-10

## 2024-06-16 ASSESSMENT — PAIN SCALES - GENERAL
PAINLEVEL_OUTOF10: 0 - NO PAIN
PAINLEVEL_OUTOF10: 7

## 2024-06-16 ASSESSMENT — PAIN DESCRIPTION - PROGRESSION: CLINICAL_PROGRESSION: RESOLVED

## 2024-06-16 NOTE — ED PROVIDER NOTES
HPI   Chief Complaint   Patient presents with    Flu Symptoms     Pt arrives private auto from home. States nausea,vomiting, body aches, chills since Thursday. States sinus pressure/pain. Family member recently sick with stomach flu.        48-year-old female who presents with sinus pressure and myalgias and rigors.  Patient states over the past 3 to 4 days she has not been feeling well feels like she is flulike symptoms she has been having rigors throughout the day where she is shaking.  She has no objective fever.  She states she is having sinus pressure.  Denies any cough.  Denies any dysuria, frequency or urgency.  Denies any chest pain or shortness of breath.                          No data recorded                   Patient History   History reviewed. No pertinent past medical history.  History reviewed. No pertinent surgical history.  Family History   Problem Relation Name Age of Onset    Coronary artery disease Father      Hypoparathyroidism Father      Diabetes type II Father      Migraines Neg Hx       Social History     Tobacco Use    Smoking status: Never    Smokeless tobacco: Never   Substance Use Topics    Alcohol use: Yes     Comment: socially    Drug use: Never       Physical Exam   ED Triage Vitals [06/16/24 0948]   Temperature Heart Rate Respirations BP   37.1 °C (98.8 °F) 91 18 123/73      Pulse Ox Temp src Heart Rate Source Patient Position   97 % -- -- --      BP Location FiO2 (%)     -- --       Physical Exam  Constitutional:       Appearance: Normal appearance. She is normal weight.   HENT:      Head: Normocephalic and atraumatic.      Nose: Nose normal.      Mouth/Throat:      Mouth: Mucous membranes are moist.      Pharynx: Oropharynx is clear.   Eyes:      Extraocular Movements: Extraocular movements intact.      Conjunctiva/sclera: Conjunctivae normal.      Pupils: Pupils are equal, round, and reactive to light.   Cardiovascular:      Rate and Rhythm: Normal rate and regular rhythm.    Pulmonary:      Effort: Pulmonary effort is normal.      Breath sounds: Normal breath sounds.   Abdominal:      General: Abdomen is flat. Bowel sounds are normal.      Palpations: Abdomen is soft.   Musculoskeletal:         General: Normal range of motion.      Cervical back: Normal range of motion and neck supple.   Skin:     General: Skin is warm and dry.      Capillary Refill: Capillary refill takes less than 2 seconds.   Neurological:      General: No focal deficit present.      Mental Status: She is alert.   Psychiatric:         Mood and Affect: Mood normal.         Behavior: Behavior normal.         Thought Content: Thought content normal.         Judgment: Judgment normal.       Labs Reviewed   CBC WITH AUTO DIFFERENTIAL - Abnormal       Result Value    WBC 10.6      nRBC 0.0      RBC 4.24      Hemoglobin 12.9      Hematocrit 39.3      MCV 93      MCH 30.4      MCHC 32.8      RDW 13.7      Platelets 184      Neutrophils % 88.5      Immature Granulocytes %, Automated 0.4      Lymphocytes % 3.3      Monocytes % 7.5      Eosinophils % 0.1      Basophils % 0.2      Neutrophils Absolute 9.41 (*)     Immature Granulocytes Absolute, Automated 0.04      Lymphocytes Absolute 0.35 (*)     Monocytes Absolute 0.80      Eosinophils Absolute 0.01      Basophils Absolute 0.02     COMPREHENSIVE METABOLIC PANEL - Abnormal    Glucose 122 (*)     Sodium 135 (*)     Potassium 4.2      Chloride 104      Bicarbonate 24      Anion Gap 11      Urea Nitrogen 12      Creatinine 0.95      eGFR 74      Calcium 8.9      Albumin 3.8      Alkaline Phosphatase 64      Total Protein 7.2      AST 21      Bilirubin, Total 0.6      ALT 20     URINALYSIS WITH REFLEX CULTURE AND MICROSCOPIC - Abnormal    Color, Urine Yellow      Appearance, Urine Clear      Specific Gravity, Urine 1.010      pH, Urine 7.5      Protein, Urine 30 (1+) (*)     Glucose, Urine Normal      Blood, Urine 0.03 (TRACE) (*)     Ketones, Urine NEGATIVE      Bilirubin,  Urine NEGATIVE      Urobilinogen, Urine Normal      Nitrite, Urine NEGATIVE      Leukocyte Esterase, Urine 250 Mirella/µL (*)    MICROSCOPIC ONLY, URINE - Abnormal    WBC, Urine >50 (*)     WBC Clumps, Urine RARE      RBC, Urine 3-5      Squamous Epithelial Cells, Urine 1-9 (SPARSE)      Bacteria, Urine 2+ (*)    LACTATE - Normal    Lactate 1.0      Narrative:     Venipuncture immediately after or during the administration of Metamizole may lead to falsely low results. Testing should be performed immediately  prior to Metamizole dosing.   HCG, URINE, QUALITATIVE - Normal    HCG, Urine NEGATIVE     INFLUENZA A AND B PCR - Normal    Flu A Result Not Detected      Flu B Result Not Detected      Narrative:     This assay is an in vitro diagnostic multiplex nucleic acid amplification test for the detection and discrimination of Influenza A & B from nasopharyngeal specimens, and has been validated for use at [facility]. Negative results do not preclude Influenza A/B infections, and should not be used as the sole basis for diagnosis, treatment, or other management decisions. If Influenza A/B and RSV PCR results are negative, testing for Parainfluenza virus, Adenovirus and Metapneumovirus is routinely performed for [facility] pediatric oncology and intensive care inpatients, and is available on other patients by placing an add-on request.   SARS-COV-2 PCR - Normal    Coronavirus 2019, PCR Not Detected      Narrative:     This assay has received FDA Emergency Use Authorization (EUA) and is only authorized for the duration of time that circumstances exist to justify the authorization of the emergency use of in vitro diagnostic tests for the detection of SARS-CoV-2 virus and/or diagnosis of COVID-19 infection under section 564(b)(1) of the Act, 21 U.S.C. 360bbb-3(b)(1). This assay is an in vitro diagnostic nucleic acid amplification test for the qualitative detection of SARS-CoV-2 from nasopharyngeal specimens and  has been validated for use at [facility]. Negative results do not preclude COVID-19 infections and should not be used as the sole basis for diagnosis, treatment, or other management decisions.     URINE CULTURE   URINALYSIS WITH REFLEX CULTURE AND MICROSCOPIC    Narrative:     The following orders were created for panel order Urinalysis with Reflex Culture and Microscopic.  Procedure                               Abnormality         Status                     ---------                               -----------         ------                     Urinalysis with Reflex C...[[id number]]  Abnormal            Final result               Extra Urine Gray Tube[[id number]]                            In process                   Please view results for these tests on the individual orders.   EXTRA URINE GRAY TUBE       CT sinus wo IV contrast   Final Result   No CT evidence of significant paranasal sinus disease.        MACRO:   None.        Signed by: Raf Hernandez 6/16/2024 12:15 PM   Dictation workstation:   [id number]      XR chest 1 view   Final Result   No evidence of acute intrathoracic abnormality.        Signed by: Kit Espinal 6/16/2024 10:34 AM   Dictation workstation:   [id number]            ED Course & MDM   Diagnoses as of 06/16/24 1241   Urinary tract infection without hematuria, site unspecified       Medical Decision Making  Emergency department course, laboratory studies were obtained and reviewed.  Patient's urinalysis does show UTI present.  Patient has a normal white blood cell count.  Influenza COVID were negative.  CT showed no significant parasinus disease.  Chest x-ray shows no acute infiltrate or effusion.  Patient was given a gram of Rocephin IV.  I feel comfortable discharging her home and treating her for her UTI.  Patient is instructed to follow-up with her primary care physician.        Procedure  Procedures     Martine Colindres,   06/16/24 1241

## 2024-06-18 LAB — BACTERIA UR CULT: ABNORMAL

## 2024-08-01 ENCOUNTER — APPOINTMENT (OUTPATIENT)
Dept: PRIMARY CARE | Facility: CLINIC | Age: 49
End: 2024-08-01
Payer: COMMERCIAL

## 2024-08-01 VITALS
SYSTOLIC BLOOD PRESSURE: 120 MMHG | HEIGHT: 66 IN | BODY MASS INDEX: 33.75 KG/M2 | HEART RATE: 60 BPM | WEIGHT: 210 LBS | DIASTOLIC BLOOD PRESSURE: 80 MMHG | OXYGEN SATURATION: 98 %

## 2024-08-01 DIAGNOSIS — N95.1 MENOPAUSAL SYMPTOM: Primary | ICD-10-CM

## 2024-08-01 DIAGNOSIS — F41.1 ANXIETY, GENERALIZED: ICD-10-CM

## 2024-08-01 PROCEDURE — 3008F BODY MASS INDEX DOCD: CPT | Performed by: INTERNAL MEDICINE

## 2024-08-01 PROCEDURE — 99213 OFFICE O/P EST LOW 20 MIN: CPT | Performed by: INTERNAL MEDICINE

## 2024-08-01 PROCEDURE — 1036F TOBACCO NON-USER: CPT | Performed by: INTERNAL MEDICINE

## 2024-08-01 RX ORDER — PAROXETINE 10 MG/1
10 TABLET, FILM COATED ORAL EVERY MORNING
Qty: 30 TABLET | Refills: 1 | Status: SHIPPED | OUTPATIENT
Start: 2024-08-01 | End: 2024-09-30

## 2024-08-01 RX ORDER — ERGOCALCIFEROL 1.25 MG/1
1 CAPSULE ORAL
COMMUNITY
Start: 2024-07-22

## 2024-08-01 NOTE — PROGRESS NOTES
"Subjective   Patient ID: Mary Brown is a 48 y.o. female who presents for Follow-up.    HPI   Seen neurologist.  Confirmed migraine diagnosis.  Has not had a migraine recently.  Ubrelvy helps  BuSpar did not help with anxiety  Has hot flashes preventing her from sleeping.  Has anger issues.  Getting angry and emotional for no particular reason  Would like some help  Eating healthy and exercising.  Lost some weight.  Would like weight loss medication  Review of Systems  No fever or chills  Appetite loss  No nausea vomiting heartburn  No numbness or tingling or weakness  Objective   /80   Pulse 60   Ht 1.676 m (5' 6\")   Wt 95.3 kg (210 lb)   SpO2 98%   BMI 33.89 kg/m²     Physical Exam    Constitutional:       Appearance: Normal appearance.   HENT:      Head: Normocephalic.   Cardiovascular:      Rate and Rhythm: Normal rate and regular rhythm.   Pulmonary:      Effort: Pulmonary effort is normal.      Breath sounds: Normal breath sounds.   Abdominal:      General: Abdomen is flat. There is no distension.      Palpations: There is no mass.      Tenderness: There is no abdominal tenderness. There is no guarding or rebound.   Musculoskeletal:      Cervical back: Neck supple.   Neurological:      Mental Status: She is alert.    Assessment/Plan   Problem List Items Addressed This Visit             ICD-10-CM    Anxiety, generalized F41.1    Relevant Medications    PARoxetine (Paxil) 10 mg tablet    Menopausal symptom - Primary N95.1    Relevant Medications    PARoxetine (Paxil) 10 mg tablet    Other Relevant Orders    Referral to Gynecology        Patient has significant hot flashes send also anxiety and anger issues.  Will start her on paroxetine 10 mg.  Common side effects discussed.  Call me if any  Can discontinue BuSpar  Migraine less frequent now.  Continue Ubrelvy as needed  MRI completed and see neurology  Referral to gynecologist to discuss hormone replacement if needed  Continue lifestyle changes " for now.  Will consider weight loss medication if needed in future.

## 2024-09-05 ENCOUNTER — APPOINTMENT (OUTPATIENT)
Dept: PRIMARY CARE | Facility: CLINIC | Age: 49
End: 2024-09-05
Payer: COMMERCIAL

## 2024-10-11 ENCOUNTER — APPOINTMENT (OUTPATIENT)
Dept: NEUROLOGY | Facility: CLINIC | Age: 49
End: 2024-10-11
Payer: COMMERCIAL

## 2025-01-30 ENCOUNTER — APPOINTMENT (OUTPATIENT)
Dept: RADIOLOGY | Facility: HOSPITAL | Age: 50
End: 2025-01-30
Payer: COMMERCIAL

## 2025-01-30 ENCOUNTER — HOSPITAL ENCOUNTER (EMERGENCY)
Facility: HOSPITAL | Age: 50
Discharge: HOME | End: 2025-01-30
Payer: COMMERCIAL

## 2025-01-30 ENCOUNTER — OFFICE VISIT (OUTPATIENT)
Dept: ORTHOPEDIC SURGERY | Facility: CLINIC | Age: 50
End: 2025-01-30
Payer: COMMERCIAL

## 2025-01-30 VITALS — WEIGHT: 220 LBS | BODY MASS INDEX: 35.36 KG/M2 | HEIGHT: 66 IN

## 2025-01-30 VITALS
BODY MASS INDEX: 35.36 KG/M2 | HEIGHT: 66 IN | RESPIRATION RATE: 16 BRPM | HEART RATE: 54 BPM | SYSTOLIC BLOOD PRESSURE: 138 MMHG | WEIGHT: 220 LBS | OXYGEN SATURATION: 98 % | DIASTOLIC BLOOD PRESSURE: 81 MMHG | TEMPERATURE: 98.8 F

## 2025-01-30 DIAGNOSIS — M25.562 ACUTE PAIN OF LEFT KNEE: ICD-10-CM

## 2025-01-30 DIAGNOSIS — S86.912A STRAIN OF LEFT KNEE, INITIAL ENCOUNTER: ICD-10-CM

## 2025-01-30 DIAGNOSIS — M17.12 ARTHRITIS OF LEFT KNEE: ICD-10-CM

## 2025-01-30 DIAGNOSIS — M25.562 ACUTE PAIN OF LEFT KNEE: Primary | ICD-10-CM

## 2025-01-30 PROCEDURE — 99213 OFFICE O/P EST LOW 20 MIN: CPT | Mod: 25

## 2025-01-30 PROCEDURE — 2500000004 HC RX 250 GENERAL PHARMACY W/ HCPCS (ALT 636 FOR OP/ED): Performed by: PHYSICIAN ASSISTANT

## 2025-01-30 PROCEDURE — 99284 EMERGENCY DEPT VISIT MOD MDM: CPT | Mod: 25

## 2025-01-30 PROCEDURE — 20610 DRAIN/INJ JOINT/BURSA W/O US: CPT | Mod: LT

## 2025-01-30 PROCEDURE — 99203 OFFICE O/P NEW LOW 30 MIN: CPT

## 2025-01-30 PROCEDURE — 2500000004 HC RX 250 GENERAL PHARMACY W/ HCPCS (ALT 636 FOR OP/ED)

## 2025-01-30 PROCEDURE — 3008F BODY MASS INDEX DOCD: CPT

## 2025-01-30 PROCEDURE — 73564 X-RAY EXAM KNEE 4 OR MORE: CPT | Mod: LT

## 2025-01-30 PROCEDURE — 96372 THER/PROPH/DIAG INJ SC/IM: CPT | Performed by: PHYSICIAN ASSISTANT

## 2025-01-30 RX ORDER — KETOROLAC TROMETHAMINE 30 MG/ML
30 INJECTION, SOLUTION INTRAMUSCULAR; INTRAVENOUS ONCE
Status: COMPLETED | OUTPATIENT
Start: 2025-01-30 | End: 2025-01-30

## 2025-01-30 RX ADMIN — TRIAMCINOLONE ACETONIDE 40 MG: 40 INJECTION, SUSPENSION INTRA-ARTICULAR; INTRAMUSCULAR at 12:00

## 2025-01-30 RX ADMIN — LIDOCAINE HYDROCHLORIDE 2 ML: 20 INJECTION, SOLUTION INFILTRATION; PERINEURAL at 12:00

## 2025-01-30 RX ADMIN — KETOROLAC TROMETHAMINE 30 MG: 30 INJECTION, SOLUTION INTRAMUSCULAR at 09:49

## 2025-01-30 ASSESSMENT — PAIN SCALES - GENERAL
PAINLEVEL_OUTOF10: 0 - NO PAIN
PAINLEVEL_OUTOF10: 7

## 2025-01-30 ASSESSMENT — PAIN DESCRIPTION - PAIN TYPE: TYPE: ACUTE PAIN

## 2025-01-30 ASSESSMENT — PAIN - FUNCTIONAL ASSESSMENT: PAIN_FUNCTIONAL_ASSESSMENT: 0-10

## 2025-01-30 ASSESSMENT — PAIN DESCRIPTION - LOCATION: LOCATION: KNEE

## 2025-01-30 ASSESSMENT — PAIN DESCRIPTION - ORIENTATION: ORIENTATION: LEFT

## 2025-01-30 ASSESSMENT — PAIN DESCRIPTION - DESCRIPTORS: DESCRIPTORS: SORE

## 2025-01-30 NOTE — DISCHARGE INSTRUCTIONS
Follow up with orthopedics.  I did give a referral to Ortho clinic as well.  Please use crutches that you have at home and continues or Naprosyn for pain.

## 2025-01-30 NOTE — ED PROVIDER NOTES
HPI   Chief Complaint   Patient presents with    Knee Pain     PT. STATES INJURED LEFT KNEE AT GYM YESTERDAY. PT. STATES HX MENISCUS TEAR WITH SCOPE TO SAME KNEE IN PAST. PT. STATES FEELS LIKE SOMETHING MOVING IN KNEE, HARD TO PUT PRESSURE ON LEFT LEG.        HPI This is a 49-year-old female who injured her knee yesterday at the gym.  She actually was just on a warm up and running and she felt like maybe she did something her meniscus.  She is injured her meniscus years ago.  It hurts when she moves or puts pressure on especially on the lateral aspect.  Ibuprofen last night but nothing today.  She did not get about no numbness tingling or weakness.  She actually has a knee brace on it currently for comfort.  Is any other symptoms any other injury.  No thigh pain no hip pain no ankle pain.        Patient History   History reviewed. No pertinent past medical history.  Past Surgical History:   Procedure Laterality Date    KNEE Left      Family History   Problem Relation Name Age of Onset    Coronary artery disease Father      Hypoparathyroidism Father      Diabetes type II Father      Migraines Neg Hx       Social History     Tobacco Use    Smoking status: Never    Smokeless tobacco: Never   Vaping Use    Vaping status: Never Used   Substance Use Topics    Alcohol use: Yes     Comment: socially    Drug use: Never       Physical Exam   ED Triage Vitals [01/30/25 0908]   Temperature Heart Rate Respirations BP   37.1 °C (98.8 °F) 54 16 138/81      Pulse Ox Temp Source Heart Rate Source Patient Position   98 % Temporal Monitor Sitting      BP Location FiO2 (%)     Left arm --       Physical Exam  Family history, social history ,and allergies reviewed    Review of systems as noted in history of present illness otherwise negative    Knee injury:  Gen.: Vitals noted. No distress. Afebrile.  Neck: Supple. No adenopathy.  Cardiac: Regular rate rhythm. No murmur.  Pulmonary: Equal breath sounds bilaterally. No adventitious  breath sounds.  Abdomen: Soft, nontender,   Back: Nontender throughout. FROM  Lower extremity: There is no tenderness over the medial joint line. There is  tenderness over the lateral joint line. The extensor mechanism is intact. There is no obvious laxity. The remainder of the extremity, specifically, the tib-fib, ankle, and foot are nontender. Skin is intact.  Neurovascularly intact distally. There is no evidence of an intra-articular infection. Compartments are soft to palpation. There is no suggestion of DVT. Achilles intact. NVIT. DP+2/4      ED Course & MDM   ED Course as of 01/30/25 1026   Thu Jan 30, 2025   1024 Advanced osteoarthritis left knee. No evidence of fracture. Alignment  normal.   [CV]      ED Course User Index  [CV] oYla Karimi PA-C         Diagnoses as of 01/30/25 1026   Acute pain of left knee   Strain of left knee, initial encounter     Imaging of left knee.toradol given.  Continue wearing her knee brace.  Suggest crutches as well.  Follow-up with orthopedic            No data recorded     [place] Coma Scale Score: 15 (01/30/25 0909 : Brie Melchor RN)                           Medical Decision Making  Follow up with Orthopedic doctor. Crutches and knee brace. Naprosyn for pain    Procedure  Procedures     Yola Karimi PA-C  01/30/25 1026

## 2025-01-30 NOTE — ED TRIAGE NOTES
PT. STATES INJURED LEFT KNEE AT GYM YESTERDAY. PT. STATES HX MENISCUS TEAR WITH SCOPE TO SAME KNEE IN PAST. PT. STATES FEELS LIKE SOMETHING MOVING IN KNEE, HARD TO PUT PRESSURE ON LEFT LEG.

## 2025-01-31 ENCOUNTER — OFFICE VISIT (OUTPATIENT)
Dept: ORTHOPEDIC SURGERY | Facility: CLINIC | Age: 50
End: 2025-01-31
Payer: COMMERCIAL

## 2025-01-31 VITALS — BODY MASS INDEX: 35.36 KG/M2 | WEIGHT: 220 LBS | HEIGHT: 66 IN

## 2025-01-31 DIAGNOSIS — M17.12 ARTHRITIS OF LEFT KNEE: Primary | ICD-10-CM

## 2025-01-31 PROCEDURE — 3008F BODY MASS INDEX DOCD: CPT | Performed by: FAMILY MEDICINE

## 2025-01-31 PROCEDURE — 99214 OFFICE O/P EST MOD 30 MIN: CPT | Performed by: FAMILY MEDICINE

## 2025-01-31 PROCEDURE — 1036F TOBACCO NON-USER: CPT | Performed by: FAMILY MEDICINE

## 2025-01-31 RX ORDER — TRIAMCINOLONE ACETONIDE 40 MG/ML
40 INJECTION, SUSPENSION INTRA-ARTICULAR; INTRAMUSCULAR
Status: COMPLETED | OUTPATIENT
Start: 2025-01-30 | End: 2025-01-30

## 2025-01-31 RX ORDER — LIDOCAINE HYDROCHLORIDE 20 MG/ML
2 INJECTION, SOLUTION INFILTRATION; PERINEURAL
Status: COMPLETED | OUTPATIENT
Start: 2025-01-30 | End: 2025-01-30

## 2025-01-31 ASSESSMENT — ENCOUNTER SYMPTOMS: KNEE DEFORMITY: 1

## 2025-01-31 NOTE — PROGRESS NOTES
Subjective    Patient ID: Mary Brown is a 49 y.o. female.    Chief Complaint: Pain of the Left Knee (FOR 1 DAY/PATIENT HURT HER KNEE AT THE GYM)     Left Knee       49-year-old female presenting to the same-day injury clinic for evaluation of left knee pain, which has been ongoing for 1 day.  There was no known specific injury.  Patient presents directly from the emergency department where she was evaluated this morning.  States that she was at the gym yesterday warming up doing a light jog, when she feels as if she may have injured her left knee.  States that she felt a popping sensation and increased pain.  She was having significant difficulty with mobility yesterday, therefore presented to the emergency department this morning.  Multiple view x-rays of the left knee were obtained, with evidence of advanced degenerative changes, left knee arthritis.  She was given a shot of Toradol and told to follow-up with orthopedics.  Presents to the office continuing to complain of left knee pain, limited range of motion, and somewhat difficulty weightbearing on left lower extremity.  She was given crutches by the ER, which she states has helped.  She has not noticed any redness or warmth.  Patient does state that she has a history of left knee arthritis and has received cortisone injections in the past.  States that she has also had a meniscectomy many years ago on the left knee.  States that she was told she would need a knee replacement.  She has been applying heat and ice and using Tiger balm with minimal relief of symptoms.  She is an .    The patient's past medical, surgical, family, and social history as well as allergies and medications were reviewed and updated in the chart.    Objective   Ortho Exam  Pleasant and no acute distress. Walks with antalgic gait, use of crutches.  Left knee appearing without soft tissue swelling erythema or ecchymosis.  There is no warmth upon touch.  She is tender  diffusely throughout the knee.  Patellofemoral crepitus noted with range of motion testing.  Left knee range of motion is 5-110°. The knee is stable to varus and valgus stress Lachman and posterior drawer. There is a mild effusion. There is generalized tenderness.  Maximo's exam is negative.  Right knee range of motion is 0-120°. There is no effusion. The knee is stable to varus and valgus stress Lachman and posterior drawer. Both lower extremities are well perfused the skin is intact and muscle tone is adequate.    Image Results:  XR knee left 4+ views  Narrative: Interpreted By:  Kit Espinal,   STUDY:  XR KNEE LEFT 4+ VIEWS      INDICATION:  Signs/Symptoms:pain.      COMPARISON:  None      ACCESSION NUMBER(S):  [id number]      ORDERING CLINICIAN:  MARILEE MALDONADO      FINDINGS:  Advanced osteoarthritis left knee. No evidence of fracture. Alignment  normal.      Impression: Advanced left knee osteoarthritis.      Signed by: Kit Espinal 1/30/2025 10:18 AM  Dictation workstation:   [id number]    Multiple view x-rays of the left knee were personally reviewed, without evidence of acute fracture or dislocation.  There is advanced osteoarthritis throughout the knee with medial and patellofemoral joint space narrowing and peripheral osteophyte formation noted.    Assessment/Plan   Encounter Diagnoses:  Acute pain of left knee    Arthritis of left knee    Plan: Discussion with patient in regards to left knee pain with review of left knee x-rays.  Explained to patient there is no evidence of acute fracture or dislocation, but she does have significant amount arthritis in her knee with osteophyte formation.  I discussion about nonoperative and operative treatments were discussed.  She is aware that she will be a candidate for a total knee replacement in the future, but is not interested in surgery at this time.  Patient did express interest in PRP, and she was advised to follow-up with my colleague Dr. Carey to  discuss possible PRP injections.  After the risks and benefits of a left knee intra-articular steroid injection of Kenalog/lidocaine were discussed, patient agreed to injection and tolerated well.  She is aware that injection can take up to 2 weeks to take full effect.  She can receive this injection every 3 months as needed.  Viscosupplementation gel injections could be considered in the future.  She should continue with heat and ice application, topical creams and over-the-counter ibuprofen or Tylenol.  She will avoid aggravating activities and follow-up as symptoms dictate.    L Inj/Asp: L knee on 1/30/2025 12:00 PM  Indications: pain  Details: 22 G needle, superolateral approach  Medications: 40 mg triamcinolone acetonide 40 mg/mL; 2 mL lidocaine 20 mg/mL (2 %)  Procedure, treatment alternatives, risks and benefits explained, specific risks discussed. Consent was given by the patient.

## 2025-01-31 NOTE — PROGRESS NOTES
History of Present Illness   Chief Complaint   Patient presents with    Left Knee - Follow-up       The patient is 49 y.o. female  here with a complaint of left knee pain.  Onset of symptoms yesterday after doing a warm up jog before her workout, says she was able to do some boxing and some weight training however after this she had more notable discomfort.  She was seen in the emergency department yesterday where she had x-rays obtained that showed moderate degenerative changes of the medial patellofemoral compartments, she did see my nurse petitioner colleague Sonia who treated her with a knee joint injection.  Patient was interested in possible PRP, Sonia recommended follow-up with myself to discuss.  Minimal change in symptoms over the past 24 hours since injection with corticosteroid.  Patient does have history of prior knee arthroscopy/medial meniscectomy several years ago, says for the most part her knee has been relatively asymptomatic, did change her training, no longer runs, enjoys boxing and weight training.    No past medical history on file.    Medication Documentation Review Audit       Reviewed by MIGUEL Grajeda (Nurse Practitioner) on 25 at 1741      Medication Order Taking? Sig Documenting Provider Last Dose Status   ergocalciferol (Vitamin D-2) 1.25 MG (96483 UT) capsule [id number]  Take 1 capsule (1,250 mcg) by mouth 1 (one) time per week. Historical Provider, MD  Active   ketorolac (Toradol) injection 30 mg [id number]   Yola Karimi PA-C   25 0949   PARoxetine (Paxil) 10 mg tablet [id number]  Take 1 tablet (10 mg) by mouth once daily in the morning. Amira Miller MD   24 2359   ubrogepant (Ubrelvy) 50 mg tablet [id number] No Take 1 tablet (50 mg) by mouth once daily as needed (one daily as needed for migraine). Amira Miller MD Taking Active                    Allergies   Allergen Reactions    Sulfamethoxazole-Trimethoprim Other       Social  History     Socioeconomic History    Marital status: Single     Spouse name: Not on file    Number of children: Not on file    Years of education: Not on file    Highest education level: Not on file   Occupational History    Not on file   Tobacco Use    Smoking status: Never    Smokeless tobacco: Never   Vaping Use    Vaping status: Never Used   Substance and Sexual Activity    Alcohol use: Yes     Comment: socially    Drug use: Never    Sexual activity: Not on file   Other Topics Concern    Not on file   Social History Narrative    Not on file     Social Drivers of Health     Financial Resource Strain: Not on file   Food Insecurity: Not on file   Transportation Needs: Not on file   Physical Activity: Not on file   Stress: Not on file   Social Connections: Not on file   Intimate Partner Violence: Not on file   Housing Stability: Not on file       Past Surgical History:   Procedure Laterality Date    KNEE Left           Review of Systems   GENERAL: Negative  GI: Negative  MUSCULOSKELETAL: See HPI  SKIN: Negative  NEURO:  Negative     Physical Exam:    General/Constitutional: well appearing, no distress, appears stated age  HEENT: sclera clear  Respiratory: non labored breathing  Vascular: No edema, swelling or tenderness, except as noted in detailed exam.  Integumentary: No impressive skin lesions present, except as noted in detailed exam.  Neurological:  Alert and oriented   Psychological:  Normal mood and affect.  Musculoskeletal: Normal, except as noted in detailed exam and in HPI.  Antalgic gait, unassisted      Left knee: Normal appearance, no swelling, no skin changes, no definitive joint effusion is palpable.  There is medial joint tenderness to palpation.  Range of motion from 3 to 115 degrees with some discomfort endrange of flexion.  No motor deficits are present.  There is some pain with Maximo testing.  Negative Lachman, negative posterior drawer.  Stable to varus and valgus stress.     Imaging: X-rays  of left knee from 1/30/2025 independently reviewed, there is moderate degenerative changes in the medial compartment with joint space narrowing, osteophytosis, there is moderate to advanced degenerative changes of the patellofemoral compartment      Assessment   1. Arthritis of left knee              Plan: discussed diagnosis, further workup and treatment.  Hopefully she will see good improvement in symptoms with Kenalog injection over the next few days, long-term she was interested in PRP for her treatment plan, would like to avoid surgery for as long as possible understands that knee replacement surgery would be what she would need at some point.  I explained to patient that I would typically wait 6 weeks following Kenalog injection before PRP injection which she is okay with.  She was directed that she will refrain from all NSAIDs for at least 2 weeks prior to the procedure.  She has no other contraindications to PRP.  She understands that it is cash pay $711 at time of visit, not covered by insurance.

## 2025-03-13 ENCOUNTER — HOSPITAL ENCOUNTER (OUTPATIENT)
Dept: RADIOLOGY | Facility: EXTERNAL LOCATION | Age: 50
Discharge: HOME | End: 2025-03-13

## 2025-03-14 ENCOUNTER — APPOINTMENT (OUTPATIENT)
Dept: ORTHOPEDIC SURGERY | Facility: CLINIC | Age: 50
End: 2025-03-14

## 2025-03-14 DIAGNOSIS — M25.562 ACUTE PAIN OF LEFT KNEE: ICD-10-CM

## 2025-03-14 DIAGNOSIS — M17.12 ARTHRITIS OF LEFT KNEE: Primary | ICD-10-CM

## 2025-03-14 RX ORDER — TRAMADOL HYDROCHLORIDE 50 MG/1
50 TABLET ORAL EVERY 6 HOURS PRN
Qty: 12 TABLET | Refills: 0 | Status: SHIPPED | OUTPATIENT
Start: 2025-03-14 | End: 2025-03-17

## 2025-03-14 NOTE — PROGRESS NOTES
History of Present Illness   Chief Complaint   Patient presents with    Left Knee - Injections     PRP       The patient is 49 y.o. female  here with a complaint of left knee PRP injection for treatment of underlying moderate osteoarthritis.  See previous note for full details of history.  In brief patient developed some acute on more mildly chronic left knee pain after doing some box jumps, x-rays showed moderate degenerative changes, she did have Kenalog injection 6 weeks ago that was of some benefit but was interested in pursuing PRP injection today.  She has been off NSAIDs for the past 2 weeks.  No other contraindication to PRP injection.    History reviewed. No pertinent past medical history.    Medication Documentation Review Audit       Reviewed by Mino Carey MD (Physician) on 25 at 0843      Medication Order Taking? Sig Documenting Provider Last Dose Status   ergocalciferol (Vitamin D-2) 1.25 MG (64880 UT) capsule [id number]  Take 1 capsule (1,250 mcg) by mouth 1 (one) time per week. Historical Provider, MD  Active   PARoxetine (Paxil) 10 mg tablet [id number]  Take 1 tablet (10 mg) by mouth once daily in the morning. Amira Miller MD   [id number]   traMADol (Ultram) 50 mg tablet [id number]  Take 1 tablet (50 mg) by mouth every 6 hours if needed for severe pain (7 - 10) for up to 3 days. Mino Carey MD  Active   ubrogepant (Ubrelvy) 50 mg tablet [id number] No Take 1 tablet (50 mg) by mouth once daily as needed (one daily as needed for migraine). Amira Miller MD Taking Active                    Allergies   Allergen Reactions    Sulfamethoxazole-Trimethoprim Other       Social History     Socioeconomic History    Marital status: Single     Spouse name: Not on file    Number of children: Not on file    Years of education: Not on file    Highest education level: Not on file   Occupational History    Not on file   Tobacco Use    Smoking status: Never    Smokeless tobacco: Never   Vaping  Use    Vaping status: Never Used   Substance and Sexual Activity    Alcohol use: Yes     Comment: socially    Drug use: Never    Sexual activity: Not on file   Other Topics Concern    Not on file   Social History Narrative    Not on file     Social Drivers of Health     Financial Resource Strain: Not on file   Food Insecurity: Not on file   Transportation Needs: Not on file   Physical Activity: Not on file   Stress: Not on file   Social Connections: Not on file   Intimate Partner Violence: Not on file   Housing Stability: Not on file       Past Surgical History:   Procedure Laterality Date    KNEE Left           Review of Systems   GENERAL: Negative  GI: Negative  MUSCULOSKELETAL: See HPI  SKIN: Negative  NEURO:  Negative     Physical Exam:    General/Constitutional: well appearing, no distress, appears stated age  HEENT: sclera clear  Respiratory: non labored breathing  Vascular: No edema, swelling or tenderness, except as noted in detailed exam.  Integumentary: No impressive skin lesions present, except as noted in detailed exam.  Neurological:  Alert and oriented   Psychological:  Normal mood and affect.  Musculoskeletal: Normal, except as noted in detailed exam and in HPI    Left knee: Normal appearance, no skin changes at injection site.      PROCEDURE      Left knee joint PRP injection under ultrasound guidance    Consent:  After the risks and benefits of the procedure were explained, consent was given by the patient to proceed.     Procedure:    52 cc of peripheral blood with extracted from the patient at the left AC fossa. This was mixed with 8cc of anticoagulant. The blood the then centrifuged using the [employer] system to obtain ~3 cc of leukocyte poor PRP    The left knee joint and surrounding structures was then visualized under ultrasound guidance. The injection site was prepped and cleaned with alcohol solution.     Under ultrasound guidance using a superior lateral approach the left knee joint was  injected with PRP solution obtained from patient as above.  During injection, there was unrestricted flow and care was taken not to inject into the skin or subcutaneous tissues.     A sterile band-aide was applied.  Post-injection instructions were given regarding post-procedure care, when to follow up in clinic and what to expect from the procedure.  The patient tolerated the injection well and was discharged without complication.        Assessment   1. Arthritis of left knee  traMADol (Ultram) 50 mg tablet      2. Acute pain of left knee  Point of Care Ultrasound    traMADol (Ultram) 50 mg tablet            Plan: Successful ultrasound-guided left knee joint injection with leukocyte poor PRP, she tolerated without issue, see procedure note for full details.  Postinjection instructions were provided, she will avoid NSAIDs for the next 2 weeks, she can use Tylenol, I did send a prescription in for tramadol to take as needed for more severe pain over the next few days if she has any worsening pain following injection.  She will monitor for relief over the next 6 to 12 weeks.  She will plan to follow-up as symptoms dictate.

## 2025-07-08 DIAGNOSIS — Z12.31 ENCOUNTER FOR SCREENING MAMMOGRAM FOR BREAST CANCER: ICD-10-CM
